# Patient Record
Sex: FEMALE | Race: BLACK OR AFRICAN AMERICAN
[De-identification: names, ages, dates, MRNs, and addresses within clinical notes are randomized per-mention and may not be internally consistent; named-entity substitution may affect disease eponyms.]

---

## 2021-12-29 PROBLEM — Z00.00 ENCOUNTER FOR PREVENTIVE HEALTH EXAMINATION: Status: ACTIVE | Noted: 2021-12-29

## 2022-01-05 ENCOUNTER — APPOINTMENT (OUTPATIENT)
Dept: OTOLARYNGOLOGY | Facility: CLINIC | Age: 72
End: 2022-01-05
Payer: MEDICARE

## 2022-01-05 VITALS
SYSTOLIC BLOOD PRESSURE: 120 MMHG | WEIGHT: 165 LBS | BODY MASS INDEX: 29.23 KG/M2 | DIASTOLIC BLOOD PRESSURE: 71 MMHG | HEART RATE: 69 BPM | HEIGHT: 63 IN

## 2022-01-05 DIAGNOSIS — K11.6 MUCOCELE OF SALIVARY GLAND: ICD-10-CM

## 2022-01-05 DIAGNOSIS — R55 SYNCOPE AND COLLAPSE: ICD-10-CM

## 2022-01-05 DIAGNOSIS — Z80.9 FAMILY HISTORY OF MALIGNANT NEOPLASM, UNSPECIFIED: ICD-10-CM

## 2022-01-05 DIAGNOSIS — R20.0 ANESTHESIA OF SKIN: ICD-10-CM

## 2022-01-05 DIAGNOSIS — I51.9 HEART DISEASE, UNSPECIFIED: ICD-10-CM

## 2022-01-05 DIAGNOSIS — Z86.39 PERSONAL HISTORY OF OTHER ENDOCRINE, NUTRITIONAL AND METABOLIC DISEASE: ICD-10-CM

## 2022-01-05 DIAGNOSIS — Z78.9 OTHER SPECIFIED HEALTH STATUS: ICD-10-CM

## 2022-01-05 PROCEDURE — 99204 OFFICE O/P NEW MOD 45 MIN: CPT

## 2022-01-05 RX ORDER — CHROMIUM 200 MCG
TABLET ORAL
Refills: 0 | Status: ACTIVE | COMMUNITY

## 2022-01-05 RX ORDER — AMLODIPINE BESYLATE 5 MG/1
TABLET ORAL
Refills: 0 | Status: ACTIVE | COMMUNITY

## 2022-01-05 RX ORDER — ASPIRIN 325 MG/1
TABLET, FILM COATED ORAL
Refills: 0 | Status: ACTIVE | COMMUNITY

## 2022-01-05 RX ORDER — ATORVASTATIN CALCIUM 80 MG/1
TABLET, FILM COATED ORAL
Refills: 0 | Status: ACTIVE | COMMUNITY

## 2022-01-10 NOTE — HISTORY OF PRESENT ILLNESS
[de-identified] : The patient presents with h/o thyroid disorder, thyroid nodule, s/p hysterectomy, thyroid surgery, tonsillectomy. The patient presents today with c/o swelling of neck. Pt reports feeling numbness sensation on the left side from head to toe. Pt also reports of tightness of the whole frontal jaw. Pt denies any swelling when she eats sour food. Pt states she had MRI brain done before which showed all normal.

## 2022-01-10 NOTE — REVIEW OF SYSTEMS
[Dizziness] : dizziness [Vertigo] : vertigo [Lightheadedness] : lightheadedness [Throat Pain] : throat pain [Palpitations] : palpitations [Joint Pain] : joint pain [Anxiety] : anxiety [Negative] : Heme/Lymph [FreeTextEntry1] : headaches, muscle pain, passing out

## 2022-01-10 NOTE — PHYSICAL EXAM
[Hearing Galan Test (Tuning Fork On Forehead)] : no lateralization of tone [Midline] : trachea located in midline position [Removed] : palatine tonsils previously removed [de-identified] : crowded airway rucoplasia of cheeks  [de-identified] : right parotid more full than left - seems to be superficial  [Hearing Loss Right Only] : normal [Hearing Loss Left Only] : normal [] : polypoid edema bilaterally [FreeTextEntry1] : crowded airway, leukoplakia of cheeks [Normal] : orientation to person, place, and time: normal [FreeTextEntry2] : sinuses nontender to percussion sensations intact  [de-identified] : PERRL

## 2022-01-10 NOTE — ASSESSMENT
[FreeTextEntry1] : Reviewed and reconciled medications, allergies, PMHx, PSHx, SocHx, FMHx. \par \par h/o thyroid disorder, thyroid nodule\par s/p hysterectomy, thyroid surgery, tonsillectomy.\par \par MRI cervical spine 12/16/2021 - cystic abnormality in the right parotid gland. Straightening of the cervical lordosis and multilevel degenerative cervical spondylosis.  \par \par Plan:\par MRI results discussed. US fine needle aspiration 1st lesion ordered  FU after results \par \par \par \par \par

## 2022-01-10 NOTE — CONSULT LETTER
[Dear  ___] : Dear  [unfilled], [Consult Letter:] : I had the pleasure of evaluating your patient, [unfilled]. [Please see my note below.] : Please see my note below. [Consult Closing:] : Thank you very much for allowing me to participate in the care of this patient.  If you have any questions, please do not hesitate to contact me. [Sincerely,] : Sincerely, [FreeTextEntry3] : Juaquin Martinez MD FACS

## 2022-01-10 NOTE — ADDENDUM
[FreeTextEntry1] : Documented by Nisha Messina acting as scribe for Dr. Martinez on 01/05/2022. \par \par All Medical record entries made by the scribe were at my. Dr. Martinez direction and personally dictated by me on 01/05/2022. I have reviewed the chart and agree that the record accurately reflects my personal performance of the history, physical exam, assessment and plan. I have also personally directed, reviewed, and agreed with the discharge instructions.

## 2023-08-07 ENCOUNTER — APPOINTMENT (OUTPATIENT)
Dept: OTOLARYNGOLOGY | Facility: CLINIC | Age: 73
End: 2023-08-07
Payer: MEDICARE

## 2023-08-07 VITALS
WEIGHT: 163 LBS | HEART RATE: 68 BPM | SYSTOLIC BLOOD PRESSURE: 146 MMHG | DIASTOLIC BLOOD PRESSURE: 78 MMHG | HEIGHT: 63 IN | BODY MASS INDEX: 28.88 KG/M2

## 2023-08-07 DIAGNOSIS — H93.13 TINNITUS, BILATERAL: ICD-10-CM

## 2023-08-07 DIAGNOSIS — K11.8 OTHER DISEASES OF SALIVARY GLANDS: ICD-10-CM

## 2023-08-07 DIAGNOSIS — J31.0 CHRONIC RHINITIS: ICD-10-CM

## 2023-08-07 DIAGNOSIS — H69.83 OTHER SPECIFIED DISORDERS OF EUSTACHIAN TUBE, BILATERAL: ICD-10-CM

## 2023-08-07 DIAGNOSIS — J34.2 DEVIATED NASAL SEPTUM: ICD-10-CM

## 2023-08-07 DIAGNOSIS — K11.20 SIALOADENITIS, UNSPECIFIED: ICD-10-CM

## 2023-08-07 PROCEDURE — 99213 OFFICE O/P EST LOW 20 MIN: CPT

## 2023-08-07 PROCEDURE — 92557 COMPREHENSIVE HEARING TEST: CPT

## 2023-08-07 PROCEDURE — 92570 ACOUSTIC IMMITANCE TESTING: CPT

## 2023-08-07 RX ORDER — MOMETASONE 50 UG/1
50 SPRAY, METERED NASAL TWICE DAILY
Qty: 1 | Refills: 5 | Status: ACTIVE | COMMUNITY
Start: 2023-08-07 | End: 1900-01-01

## 2023-08-07 NOTE — DATA REVIEWED
[de-identified] : Type A tymps AU; abnormal ETF AU normal to borderline normal hearing, 250-8000 Hz, AU REC: ENT f/u, re-eval per MD

## 2023-08-07 NOTE — REASON FOR VISIT
[Subsequent Evaluation] : a subsequent evaluation for [FreeTextEntry2] : mouth fills with phlegm while sleeping

## 2023-08-07 NOTE — PHYSICAL EXAM
[Midline] : trachea located in midline position [Normal] : orientation to person, place, and time: normal [de-identified] : submandibular glands soft [FreeTextEntry8] : raised firm thick cystic lesion [de-identified] : mildly deviated septum  [de-identified] : mildly inflamed turbinates  [de-identified] : tongue up against palate [de-identified] : crowded airway, type 3 oral cavity, leukoplakia on cheeks, excessive saliva submandibular glands

## 2023-08-07 NOTE — HISTORY OF PRESENT ILLNESS
[de-identified] : The patient presents with h/o thyroid disorder, thyroid nodule, s/p hysterectomy, thyroid surgery, tonsillectomy. The patient presents today c/o in the middle of the night her mouth filling with clear mucus for the past 2 months, sometimes thick sometimes watery. Pt notes she experiences ringing in her ears at night. Denies having a stuffy nose or mucus in the nose. Pt denies having a dry mouth during the day. Pt notes she drinks water before going to bed. Denies issues with hearing. Notes her last hearing test was before [facility].

## 2023-08-07 NOTE — ASSESSMENT
[FreeTextEntry1] : Reviewed and reconciled medications, allergies, PMHx, PSHx, SocHx, FMHx.  The patient presents with h/o thyroid disorder, thyroid nodule, s/p hysterectomy, thyroid surgery, tonsillectomy. The patient presents today c/o in the middle of the night her mouth filling with clear mucus for the past 2 months, sometimes thick sometimes watery.   Physical Exam - mildly deviated septum, mildly inflamed turbinates crowded airway, type 3 oral cavity, leukoplakia on cheeks, tongue up against soft palate excessive saliva submandibular glands submandibular glands soft right ear: raised firm thick cystic lesion.  Audio: Type A tymps AU; abnormal ETF AU normal to borderline normal hearing, 250-8000 Hz, AU 92% discrim at 45db AU  Plan:  Audio - results interpreted by Dr. Martinez and reviewed with the patient. 2 hours before bedtime drink a full glass of water with lemon in it. Mometasone - 2 sprays bilaterally BID, spray laterally.

## 2023-08-07 NOTE — ADDENDUM
[FreeTextEntry1] : Documented by Liss Greene acting as scribe for Dr. Martinez on 08/07/2023.  All Medical record entries made by the scribe were at my, Dr. Martinez,direction and personally dictated by me on 08/07/2023. I have reviewed the chart and agree that the record accurately reflects my personal performance of the history, physical exam, assessment and plan. I have also personally directed, reviewed, and agreed with the discharge instructions.

## 2023-08-07 NOTE — CONSULT LETTER
[Dear  ___] : Dear  [unfilled], [Courtesy Letter:] : I had the pleasure of seeing your patient, [unfilled], in my office today. [Please see my note below.] : Please see my note below. [Consult Closing:] : Thank you very much for allowing me to participate in the care of this patient.  If you have any questions, please do not hesitate to contact me. [Sincerely,] : Sincerely, [FreeTextEntry3] : Juaquin Martinez MD FACS